# Patient Record
Sex: MALE | Race: WHITE | Employment: FULL TIME | URBAN - NONMETROPOLITAN AREA
[De-identification: names, ages, dates, MRNs, and addresses within clinical notes are randomized per-mention and may not be internally consistent; named-entity substitution may affect disease eponyms.]

---

## 2017-01-04 ENCOUNTER — OFFICE VISIT (OUTPATIENT)
Dept: ORTHOPEDIC SURGERY | Age: 62
End: 2017-01-04

## 2017-01-04 DIAGNOSIS — Z96.652 STATUS POST TOTAL KNEE REPLACEMENT, LEFT: Primary | ICD-10-CM

## 2017-01-04 DIAGNOSIS — M17.12 PRIMARY LOCALIZED OSTEOARTHROSIS, LOWER LEG, LEFT: ICD-10-CM

## 2017-01-04 PROCEDURE — 73560 X-RAY EXAM OF KNEE 1 OR 2: CPT | Performed by: ORTHOPAEDIC SURGERY

## 2017-01-04 PROCEDURE — 99024 POSTOP FOLLOW-UP VISIT: CPT | Performed by: ORTHOPAEDIC SURGERY

## 2017-01-04 RX ORDER — GABAPENTIN 300 MG/1
CAPSULE ORAL
Qty: 60 CAPSULE | Refills: 3 | Status: SHIPPED | OUTPATIENT
Start: 2017-01-04 | End: 2020-10-13

## 2017-01-04 RX ORDER — HYDROCODONE BITARTRATE AND ACETAMINOPHEN 5; 325 MG/1; MG/1
1-2 TABLET ORAL EVERY 4 HOURS PRN
Qty: 60 TABLET | Refills: 0 | Status: SHIPPED | OUTPATIENT
Start: 2017-01-04 | End: 2017-01-11

## 2017-02-01 ENCOUNTER — OFFICE VISIT (OUTPATIENT)
Dept: ORTHOPEDIC SURGERY | Age: 62
End: 2017-02-01

## 2017-02-01 VITALS — WEIGHT: 229.94 LBS | BODY MASS INDEX: 28.59 KG/M2 | HEIGHT: 75 IN

## 2017-02-01 DIAGNOSIS — Z96.652 STATUS POST TOTAL KNEE REPLACEMENT, LEFT: Primary | ICD-10-CM

## 2017-02-01 DIAGNOSIS — M10.071 IDIOPATHIC GOUT INVOLVING TOE OF RIGHT FOOT, UNSPECIFIED CHRONICITY: ICD-10-CM

## 2017-02-01 DIAGNOSIS — M17.12 PRIMARY OSTEOARTHRITIS OF LEFT KNEE: ICD-10-CM

## 2017-02-01 PROCEDURE — 99024 POSTOP FOLLOW-UP VISIT: CPT | Performed by: ORTHOPAEDIC SURGERY

## 2017-02-01 RX ORDER — METHYLPREDNISOLONE 4 MG/1
TABLET ORAL
Qty: 1 KIT | Refills: 0 | Status: SHIPPED | OUTPATIENT
Start: 2017-02-01 | End: 2020-10-13

## 2017-02-01 RX ORDER — INDOMETHACIN 50 MG/1
50 CAPSULE ORAL 3 TIMES DAILY
Qty: 90 CAPSULE | Refills: 3 | Status: SHIPPED | OUTPATIENT
Start: 2017-02-01 | End: 2020-10-13

## 2017-02-01 RX ORDER — HYDROCODONE BITARTRATE AND ACETAMINOPHEN 5; 325 MG/1; MG/1
1 TABLET ORAL EVERY 4 HOURS PRN
Qty: 40 TABLET | Refills: 0 | Status: SHIPPED | OUTPATIENT
Start: 2017-02-01 | End: 2017-02-08

## 2017-07-13 ENCOUNTER — OFFICE VISIT (OUTPATIENT)
Dept: ORTHOPEDIC SURGERY | Age: 62
End: 2017-07-13

## 2017-07-13 VITALS — WEIGHT: 235 LBS | HEIGHT: 75 IN | BODY MASS INDEX: 29.22 KG/M2

## 2017-07-13 DIAGNOSIS — M79.671 RIGHT FOOT PAIN: ICD-10-CM

## 2017-07-13 DIAGNOSIS — Z96.652 STATUS POST TOTAL KNEE REPLACEMENT, LEFT: Primary | ICD-10-CM

## 2017-07-13 DIAGNOSIS — M25.571 ACUTE RIGHT ANKLE PAIN: ICD-10-CM

## 2017-07-13 DIAGNOSIS — M25.462 KNEE EFFUSION, LEFT: ICD-10-CM

## 2017-07-13 PROCEDURE — 20610 DRAIN/INJ JOINT/BURSA W/O US: CPT | Performed by: ORTHOPAEDIC SURGERY

## 2017-07-13 PROCEDURE — 99213 OFFICE O/P EST LOW 20 MIN: CPT | Performed by: ORTHOPAEDIC SURGERY

## 2017-07-13 RX ORDER — ATORVASTATIN CALCIUM 20 MG/1
TABLET, FILM COATED ORAL
Refills: 1 | COMMUNITY
Start: 2017-05-06 | End: 2017-07-13

## 2017-07-13 RX ORDER — AMOXICILLIN 500 MG/1
500 TABLET, FILM COATED ORAL 4 TIMES DAILY
Qty: 40 TABLET | Refills: 0 | Status: SHIPPED | OUTPATIENT
Start: 2017-07-13 | End: 2020-10-13

## 2017-07-13 RX ORDER — NEBIVOLOL HYDROCHLORIDE 10 MG/1
TABLET ORAL
Refills: 1 | COMMUNITY
Start: 2017-07-05 | End: 2020-10-13

## 2017-07-13 RX ORDER — CEPHALEXIN 500 MG/1
CAPSULE ORAL
Refills: 0 | COMMUNITY
Start: 2017-05-27 | End: 2020-10-13

## 2017-07-13 RX ORDER — ALLOPURINOL 100 MG/1
100 TABLET ORAL
Refills: 1 | COMMUNITY
Start: 2017-07-05 | End: 2020-10-13

## 2017-09-27 ENCOUNTER — OFFICE VISIT (OUTPATIENT)
Dept: ORTHOPEDIC SURGERY | Age: 62
End: 2017-09-27

## 2017-09-27 VITALS
HEIGHT: 75 IN | SYSTOLIC BLOOD PRESSURE: 132 MMHG | BODY MASS INDEX: 29.22 KG/M2 | WEIGHT: 235.01 LBS | HEART RATE: 89 BPM | DIASTOLIC BLOOD PRESSURE: 91 MMHG

## 2017-09-27 DIAGNOSIS — S86.301A PERONEAL TENDON INJURY, RIGHT, INITIAL ENCOUNTER: ICD-10-CM

## 2017-09-27 DIAGNOSIS — M20.22 HALLUX RIGIDUS OF BOTH FEET: ICD-10-CM

## 2017-09-27 DIAGNOSIS — M20.21 HALLUX RIGIDUS OF BOTH FEET: ICD-10-CM

## 2017-09-27 DIAGNOSIS — M79.672 PAIN IN BOTH FEET: Primary | ICD-10-CM

## 2017-09-27 DIAGNOSIS — M79.671 PAIN IN BOTH FEET: Primary | ICD-10-CM

## 2017-09-27 PROBLEM — S86.309A PERONEAL TENDON INJURY: Status: ACTIVE | Noted: 2017-09-27

## 2017-09-27 PROCEDURE — 73630 X-RAY EXAM OF FOOT: CPT | Performed by: ORTHOPAEDIC SURGERY

## 2017-09-27 PROCEDURE — 99214 OFFICE O/P EST MOD 30 MIN: CPT | Performed by: ORTHOPAEDIC SURGERY

## 2017-09-27 PROCEDURE — L3040 FT ARCH SUPRT PREMOLD LONGIT: HCPCS | Performed by: ORTHOPAEDIC SURGERY

## 2017-09-27 RX ORDER — MELOXICAM 15 MG/1
15 TABLET ORAL DAILY
Qty: 30 TABLET | Refills: 2 | Status: SHIPPED | OUTPATIENT
Start: 2017-09-27 | End: 2020-10-13

## 2017-09-27 RX ORDER — LISINOPRIL 20 MG/1
TABLET ORAL
COMMUNITY
Start: 2017-09-07 | End: 2020-10-13

## 2017-10-03 ENCOUNTER — TELEPHONE (OUTPATIENT)
Dept: ORTHOPEDIC SURGERY | Age: 62
End: 2017-10-03

## 2017-10-03 DIAGNOSIS — M25.571 ACUTE RIGHT ANKLE PAIN: Primary | ICD-10-CM

## 2020-09-09 ENCOUNTER — OFFICE VISIT (OUTPATIENT)
Dept: ORTHOPEDIC SURGERY | Age: 65
End: 2020-09-09
Payer: COMMERCIAL

## 2020-09-09 VITALS — WEIGHT: 235.01 LBS | BODY MASS INDEX: 29.22 KG/M2 | HEIGHT: 75 IN | RESPIRATION RATE: 12 BRPM

## 2020-09-09 PROBLEM — M70.21 OLECRANON BURSITIS, RIGHT ELBOW: Status: ACTIVE | Noted: 2020-09-09

## 2020-09-09 PROBLEM — M25.521 RIGHT ELBOW PAIN: Status: ACTIVE | Noted: 2020-09-09

## 2020-09-09 PROCEDURE — 29105 APPLICATION LONG ARM SPLINT: CPT | Performed by: ORTHOPAEDIC SURGERY

## 2020-09-09 PROCEDURE — 20605 DRAIN/INJ JOINT/BURSA W/O US: CPT | Performed by: ORTHOPAEDIC SURGERY

## 2020-09-09 PROCEDURE — 99203 OFFICE O/P NEW LOW 30 MIN: CPT | Performed by: ORTHOPAEDIC SURGERY

## 2020-09-09 RX ORDER — BUPIVACAINE HYDROCHLORIDE 2.5 MG/ML
30 INJECTION, SOLUTION EPIDURAL; INFILTRATION; INTRACAUDAL ONCE
Status: COMPLETED | OUTPATIENT
Start: 2020-09-09 | End: 2020-09-09

## 2020-09-09 RX ADMIN — BUPIVACAINE HYDROCHLORIDE 75 MG: 2.5 INJECTION, SOLUTION EPIDURAL; INFILTRATION; INTRACAUDAL at 11:01

## 2020-09-09 NOTE — PROGRESS NOTES
ELBOW VISIT      HISTORY OF PRESENT ILLNESS    Geri Lazo is a 76 y.o. male who presents for evaluation of right elbow swelling that is had over the last several days. He is been using ice and rates pain 5 or 6 but says he has some pain going down the forearm. He is doing Xarelto and is concerned about bleeding. Says the pain is intermittent but ice seems to help. He cannot recall any specific trauma. ROS    Well-documented patient history form dated 9/9/2020  All other ROS negative except for above. Past Surgical history    Past Surgical History:   Procedure Laterality Date    APPENDECTOMY      JOINT REPLACEMENT      right knee    KNEE SURGERY      SHOULDER ARTHROSCOPY      SHOULDER SURGERY      TOTAL KNEE ARTHROPLASTY Left 12/12/2016       PAST MEDICAL    Past Medical History:   Diagnosis Date    Arthritis     Bleeding nose     CAD (coronary artery disease)     Cancer ([facility] [address] 1970    testicular    Fractures     Hyperlipidemia     Hypertension        Allergies    No Known Allergies    Meds    Current Outpatient Medications   Medication Sig Dispense Refill    lisinopril (PRINIVIL;ZESTRIL) 20 MG tablet       meloxicam (MOBIC) 15 MG tablet Take 1 tablet by mouth daily 30 tablet 2    allopurinol (ZYLOPRIM) 100 MG tablet   1    BYSTOLIC 10 MG tablet   1    cephALEXin (KEFLEX) 500 MG capsule   0    Amoxicillin 500 MG TABS Take 500 mg by mouth 4 times daily 40 tablet 0    naproxen-esomeprazole (VIMOVO) 500-20 MG TBEC Take 500 mg by mouth 2 times daily 60 tablet 2    diclofenac (PENNSAID) 2 % SOLN Place 40 mg onto the skin 2 times daily APPLY 2X A DAY PRN 1 Bottle 3    methylPREDNISolone (MEDROL, [place],) 4 MG tablet Take by mouth.  1 kit 0    indomethacin (INDOCIN) 50 MG capsule Take 1 capsule by mouth 3 times daily 90 capsule 3    gabapentin (NEURONTIN) 300 MG capsule SIQ 1-2 PRN QHS 60 capsule 3    aspirin 325 MG EC tablet Take 1 tablet by mouth 2 times daily 60 tablet 0    docusate (1.91 m)   Wt 235 lb 0.2 oz (106.6 kg)   BMI 29.22 kg/m²   General Appearance:  Normal body habitus. Alert and oriented to person, place, and time. Affect:  Normal.   Gait:  Normal. Good balance and coordination. Skin:  Intact. Sensation:  Intact. Strength:  Intact. Reflexes:  Intact. Pulses:  Intact. Elbow Examination  Wrist extension test negative  Radial tunnel is negative  Ligament stability stable    Range of motion  Right Left   Extension     Flexion     Supination     Pronation       Medial and lateral epicondyles are nontender. Distal biceps tendon is nontender. Elbow flexion test is negative. Over the right olecranon area. He does have some fluctuance he also has some swelling and ecchymoses going distal to the midshaft of the right forearm. There is no erythema or fluctuance in the forearm area but he does have the fluctuance without evidence of infection in the olecranon bursa. IMAGING STUDIES    X-rays 2 views right elbow demonstrate a traction spur the olecranon    IMPRESSION    Right hemorrhagic olecranon bursitis    PLAN    1. Conservative care options including physical therapy, NSAIDs, bracing, and activity modification were discussed. 2.  The indications for therapeutic injections were discussed. 3.  The indications for additional imaging studies were discussed. 4.  After considering the various options discussed, the patient elected to pursue a course that includes right olecranon bursa aspiration and a posterior splint application. Under a sterile Betadine prep, the right olecranon bursal region was sterilely prepped and draped using  1% Xylocaine as a local anesthetic and ethyl chloride as a topical refrigerant. The skin was numbed and then  a 10 mL syringe with an 18-gauge needle was then inserted into the region and hematoma was aspirated. The procedure was then aborted and the wound was cleaned and dressed. Encounter Diagnoses   Name Primary?     Right elbow pain     Olecranon bursitis, right elbow Yes      Orders Placed This Encounter   Procedures    XR ELBOW RIGHT (2 VIEWS)     Standing Status:   Future     Number of Occurrences:   1     Standing Expiration Date:   9/9/2021    MI ARTHROCENTESIS ASPIR&/INJ INTERM JT/BURS W/O US    MI APPLY LONG ARM SPLINT    MI CAST SUP LNG ARM SPLINT PLST

## 2020-09-09 NOTE — LETTER
[address]  [address] [facility] [address]  Phone: [phone]  Fax: [phone]    Isac Campbell MD        September 9, 2020     Patient: Shaylee Erickson   YOB: 1955   Date of Visit: 9/9/2020       To Whom it May Concern:    Burgess Saldana was seen in my clinic on 9/9/2020. He may return to work on 9/14/2020. If you have any questions or concerns, please don't hesitate to call. Sincerely,           Sherren Freiberg.  MD Isac Hawkins MD

## 2020-10-08 ENCOUNTER — OFFICE VISIT (OUTPATIENT)
Dept: ORTHOPEDIC SURGERY | Age: 65
End: 2020-10-08
Payer: COMMERCIAL

## 2020-10-08 VITALS — BODY MASS INDEX: 29.22 KG/M2 | WEIGHT: 235 LBS | RESPIRATION RATE: 15 BRPM | HEIGHT: 75 IN

## 2020-10-08 PROCEDURE — 99213 OFFICE O/P EST LOW 20 MIN: CPT | Performed by: ORTHOPAEDIC SURGERY

## 2020-10-08 NOTE — PROGRESS NOTES
ELBOW VISIT      HISTORY OF PRESENT ILLNESS    Emeli Cid is a 76 y.o. male who presents for evaluation of right elbow olecranon bursitis. Despite the aspiration the swelling is returned and he still has pain and says it is becoming more difficult to deal with. The nature and character of his pain basically remain the same or to a slightly worse degree. ROS    Well-documented patient history form dated 9/9/2020  All other ROS negative except for above. Past Surgical history    Past Surgical History:   Procedure Laterality Date    APPENDECTOMY      JOINT REPLACEMENT      right knee    KNEE SURGERY      SHOULDER ARTHROSCOPY      SHOULDER SURGERY      TOTAL KNEE ARTHROPLASTY Left 12/12/2016       PAST MEDICAL    Past Medical History:   Diagnosis Date    Arthritis     Bleeding nose     CAD (coronary artery disease)     Cancer ([facility] [address] 1970    testicular    Fractures     Hyperlipidemia     Hypertension        Allergies    No Known Allergies    Meds    Current Outpatient Medications   Medication Sig Dispense Refill    lisinopril (PRINIVIL;ZESTRIL) 20 MG tablet       meloxicam (MOBIC) 15 MG tablet Take 1 tablet by mouth daily 30 tablet 2    allopurinol (ZYLOPRIM) 100 MG tablet   1    BYSTOLIC 10 MG tablet   1    cephALEXin (KEFLEX) 500 MG capsule   0    Amoxicillin 500 MG TABS Take 500 mg by mouth 4 times daily 40 tablet 0    naproxen-esomeprazole (VIMOVO) 500-20 MG TBEC Take 500 mg by mouth 2 times daily 60 tablet 2    diclofenac (PENNSAID) 2 % SOLN Place 40 mg onto the skin 2 times daily APPLY 2X A DAY PRN 1 Bottle 3    methylPREDNISolone (MEDROL, [place],) 4 MG tablet Take by mouth.  1 kit 0    indomethacin (INDOCIN) 50 MG capsule Take 1 capsule by mouth 3 times daily 90 capsule 3    gabapentin (NEURONTIN) 300 MG capsule SIQ 1-2 PRN QHS 60 capsule 3    aspirin 325 MG EC tablet Take 1 tablet by mouth 2 times daily 60 tablet 0    docusate sodium (COLACE, DULCOLAX) 100 MG CAPS Take 100 mg by mouth 2 times daily      magnesium hydroxide (MILK OF MAGNESIA) 400 MG/5ML suspension Take 30 mLs by mouth daily as needed for Constipation      ranolazine (RANEXA) 500 MG extended release tablet Take 500 mg by mouth 2 times daily      metoprolol tartrate (LOPRESSOR) 25 MG tablet Take 25 mg by mouth 2 times daily       No current facility-administered medications for this visit.         Social    Social History     Socioeconomic History    Marital status:      Spouse name: Not on file    Number of children: Not on file    Years of education: Not on file    Highest education level: Not on file   Occupational History    Not on file   Social Needs    Financial resource strain: Not on file    Food insecurity     Worry: Not on file     Inability: Not on file    Transportation needs     Medical: Not on file     Non-medical: Not on file   Tobacco Use    Smoking status: Former Smoker   Substance and Sexual Activity    Alcohol use: Yes     Comment: occassional    Drug use: No    Sexual activity: Yes     Partners: Female   Lifestyle    Physical activity     Days per week: Not on file     Minutes per session: Not on file    Stress: Not on file   Relationships    Social connections     Talks on phone: Not on file     Gets together: Not on file     Attends Mu-ism service: Not on file     Active member of club or organization: Not on file     Attends meetings of clubs or organizations: Not on file     Relationship status: Not on file    Intimate partner violence     Fear of current or ex partner: Not on file     Emotionally abused: Not on file     Physically abused: Not on file     Forced sexual activity: Not on file   Other Topics Concern    Not on file   Social History Narrative    Not on file       Family HISTORY    Family History   Problem Relation Age of Onset    Diabetes Mother        PHYSICAL EXAM    Vital Signs:  Resp 15   Ht 6' 3\" (1.905 m)   Wt 235 lb (106.6 kg)   BMI 29.37 kg/m² General Appearance:  Normal body habitus. Alert and oriented to person, place, and time. Affect:  Normal.   Gait:  Normal. Good balance and coordination. Skin:  Intact. Sensation:  Intact. Strength:  Intact. Reflexes:  Intact. Pulses:  Intact. Elbow Examination  Wrist extension test negative  Radial tunnel is negative  Ligament stability stable    Range of motion  Right Left   Extension     Flexion     Supination     Pronation       Medial and lateral epicondyles are nontender. Distal biceps tendon is nontender. Elbow flexion test is negative. Over the right olecranon area. He does have some fluctuance he also has some swelling and ecchymoses going distal to the midshaft of the right forearm. There is no erythema or fluctuance in the forearm area but he does have the fluctuance without evidence of infection in the olecranon bursa. IMAGING STUDIES    X-rays were not done today  IMPRESSION    Right hemorrhagic olecranon bursitis    PLAN    1. Conservative care options including physical therapy, NSAIDs, bracing, and activity modification were discussed. 2.  The indications for therapeutic injections were discussed. 3.  The indications for additional imaging studies were discussed. 4.  After considering the various options discussed, the patient elected to pursue a course that includes surgical excision of the right olecranon bursa with traction spur excision. INFORMED CONSENT NOTE        We discussed the risks, benefits, and alternatives to the proposed procedure, as well as the necessity of other members of the healthcare team participating in the procedure. All questions were answered and the patient elected to proceed with the proposed procedure and signed the informed consent form. The patient was counseled at length about the risks of raj Covid-19 during their perioperative period and any recovery window from their procedure.   The patient was made aware that raj Covid-19  may worsen their prognosis for recovering from their procedure  and lend to a higher morbidity and/or mortality risk. All material risks, benefits, and reasonable alternatives including postponing the procedure were discussed. The patient does wish to proceed with the procedure at this time.

## 2020-10-09 PROBLEM — M25.729 OLECRANON BONE SPUR: Status: ACTIVE | Noted: 2020-10-09

## 2020-10-12 ENCOUNTER — HOSPITAL ENCOUNTER (OUTPATIENT)
Age: 65
Discharge: HOME | End: 2020-10-12
Payer: COMMERCIAL

## 2020-10-12 PROCEDURE — U0003 INFECTIOUS AGENT DETECTION BY NUCLEIC ACID (DNA OR RNA); SEVERE ACUTE RESPIRATORY SYNDROME CORONAVIRUS 2 (SARS-COV-2) (CORONAVIRUS DISEASE [COVID-19]), AMPLIFIED PROBE TECHNIQUE, MAKING USE OF HIGH THROUGHPUT TECHNOLOGIES AS DESCRIBED BY CMS-2020-01-R: HCPCS

## 2020-10-13 ENCOUNTER — TELEPHONE (OUTPATIENT)
Dept: ORTHOPEDIC SURGERY | Age: 65
End: 2020-10-13

## 2020-10-13 RX ORDER — SACUBITRIL AND VALSARTAN 49; 51 MG/1; MG/1
1 TABLET, FILM COATED ORAL 2 TIMES DAILY
COMMUNITY

## 2020-10-13 RX ORDER — AMLODIPINE BESYLATE 2.5 MG/1
2.5 TABLET ORAL DAILY
COMMUNITY

## 2020-10-13 RX ORDER — FUROSEMIDE 20 MG/1
20 TABLET ORAL DAILY
COMMUNITY

## 2020-10-13 NOTE — PRE-PROCEDURE INSTRUCTIONS

## 2020-10-13 NOTE — TELEPHONE ENCOUNTER
Auth: NPR  Date: 10/19/20  Spoke with: Chandra Vincent  Type of SX: OUTPATIENT  Location: [facility]  CPT [id number]   SX area: [facility]  Insurance: [facility]

## 2020-10-14 LAB — SARS-COV-2, NAA: NOT DETECTED

## 2020-10-16 ENCOUNTER — ANESTHESIA EVENT (OUTPATIENT)
Dept: OPERATING ROOM | Age: 65
End: 2020-10-16
Payer: COMMERCIAL

## 2020-10-19 ENCOUNTER — ANESTHESIA (OUTPATIENT)
Dept: OPERATING ROOM | Age: 65
End: 2020-10-19
Payer: COMMERCIAL

## 2020-10-19 ENCOUNTER — HOSPITAL ENCOUNTER (OUTPATIENT)
Age: 65
Setting detail: OUTPATIENT SURGERY
Discharge: HOME | End: 2020-10-19
Attending: ORTHOPAEDIC SURGERY | Admitting: ORTHOPAEDIC SURGERY
Payer: COMMERCIAL

## 2020-10-19 VITALS — DIASTOLIC BLOOD PRESSURE: 57 MMHG | OXYGEN SATURATION: 99 % | SYSTOLIC BLOOD PRESSURE: 101 MMHG

## 2020-10-19 VITALS
TEMPERATURE: 97.6 F | HEART RATE: 66 BPM | RESPIRATION RATE: 16 BRPM | DIASTOLIC BLOOD PRESSURE: 86 MMHG | OXYGEN SATURATION: 97 % | BODY MASS INDEX: 28.6 KG/M2 | WEIGHT: 230 LBS | HEIGHT: 75 IN | SYSTOLIC BLOOD PRESSURE: 116 MMHG

## 2020-10-19 LAB
ANION GAP SERPL CALCULATED.3IONS-SCNC: 7 MMOL/L (ref 3–16)
BUN BLDV-MCNC: 11 MG/DL (ref 7–20)
CALCIUM SERPL-MCNC: 8.9 MG/DL (ref 8.3–10.6)
CHLORIDE BLD-SCNC: 96 MMOL/L (ref 99–110)
CO2: 26 MMOL/L (ref 21–32)
CREAT SERPL-MCNC: 0.7 MG/DL (ref 0.8–1.3)
GFR AFRICAN AMERICAN: >60
GFR NON-AFRICAN AMERICAN: >60
GLUCOSE BLD-MCNC: 137 MG/DL (ref 70–99)
POTASSIUM SERPL-SCNC: 4.7 MMOL/L (ref 3.5–5.1)
SODIUM BLD-SCNC: 129 MMOL/L (ref 136–145)

## 2020-10-19 PROCEDURE — 6360000002 HC RX W HCPCS: Performed by: NURSE ANESTHETIST, CERTIFIED REGISTERED

## 2020-10-19 PROCEDURE — 2580000003 HC RX 258: Performed by: ANESTHESIOLOGY

## 2020-10-19 PROCEDURE — 3600000012 HC SURGERY LEVEL 2 ADDTL 15MIN: Performed by: ORTHOPAEDIC SURGERY

## 2020-10-19 PROCEDURE — 2709999900 HC NON-CHARGEABLE SUPPLY: Performed by: ORTHOPAEDIC SURGERY

## 2020-10-19 PROCEDURE — 3700000001 HC ADD 15 MINUTES (ANESTHESIA): Performed by: ORTHOPAEDIC SURGERY

## 2020-10-19 PROCEDURE — 6360000002 HC RX W HCPCS: Performed by: ORTHOPAEDIC SURGERY

## 2020-10-19 PROCEDURE — 7100000010 HC PHASE II RECOVERY - FIRST 15 MIN: Performed by: ORTHOPAEDIC SURGERY

## 2020-10-19 PROCEDURE — 36415 COLL VENOUS BLD VENIPUNCTURE: CPT

## 2020-10-19 PROCEDURE — 3600000002 HC SURGERY LEVEL 2 BASE: Performed by: ORTHOPAEDIC SURGERY

## 2020-10-19 PROCEDURE — 2500000003 HC RX 250 WO HCPCS: Performed by: NURSE ANESTHETIST, CERTIFIED REGISTERED

## 2020-10-19 PROCEDURE — 80048 BASIC METABOLIC PNL TOTAL CA: CPT

## 2020-10-19 PROCEDURE — 6370000000 HC RX 637 (ALT 250 FOR IP): Performed by: ANESTHESIOLOGY

## 2020-10-19 PROCEDURE — 7100000000 HC PACU RECOVERY - FIRST 15 MIN: Performed by: ORTHOPAEDIC SURGERY

## 2020-10-19 PROCEDURE — 7100000001 HC PACU RECOVERY - ADDTL 15 MIN: Performed by: ORTHOPAEDIC SURGERY

## 2020-10-19 PROCEDURE — 84132 ASSAY OF SERUM POTASSIUM: CPT

## 2020-10-19 PROCEDURE — 3700000000 HC ANESTHESIA ATTENDED CARE: Performed by: ORTHOPAEDIC SURGERY

## 2020-10-19 PROCEDURE — 2580000003 HC RX 258: Performed by: ORTHOPAEDIC SURGERY

## 2020-10-19 PROCEDURE — 7100000011 HC PHASE II RECOVERY - ADDTL 15 MIN: Performed by: ORTHOPAEDIC SURGERY

## 2020-10-19 RX ORDER — SODIUM CHLORIDE 0.9 % (FLUSH) 0.9 %
10 SYRINGE (ML) INJECTION EVERY 12 HOURS SCHEDULED
Status: DISCONTINUED | OUTPATIENT
Start: 2020-10-19 | End: 2020-10-19 | Stop reason: HOSPADM

## 2020-10-19 RX ORDER — BUPIVACAINE HYDROCHLORIDE AND EPINEPHRINE 5; 5 MG/ML; UG/ML
INJECTION, SOLUTION PERINEURAL PRN
Status: DISCONTINUED | OUTPATIENT
Start: 2020-10-19 | End: 2020-10-19 | Stop reason: ALTCHOICE

## 2020-10-19 RX ORDER — HYDROCODONE BITARTRATE AND ACETAMINOPHEN 7.5; 325 MG/1; MG/1
1 TABLET ORAL EVERY 6 HOURS PRN
Qty: 28 TABLET | Refills: 0 | Status: SHIPPED | OUTPATIENT
Start: 2020-10-19 | End: 2020-10-26

## 2020-10-19 RX ORDER — OXYCODONE HYDROCHLORIDE AND ACETAMINOPHEN 5; 325 MG/1; MG/1
1 TABLET ORAL PRN
Status: COMPLETED | OUTPATIENT
Start: 2020-10-19 | End: 2020-10-19

## 2020-10-19 RX ORDER — FENTANYL CITRATE 50 UG/ML
INJECTION, SOLUTION INTRAMUSCULAR; INTRAVENOUS PRN
Status: DISCONTINUED | OUTPATIENT
Start: 2020-10-19 | End: 2020-10-19 | Stop reason: SDUPTHER

## 2020-10-19 RX ORDER — OXYCODONE HYDROCHLORIDE AND ACETAMINOPHEN 5; 325 MG/1; MG/1
2 TABLET ORAL PRN
Status: COMPLETED | OUTPATIENT
Start: 2020-10-19 | End: 2020-10-19

## 2020-10-19 RX ORDER — LIDOCAINE HYDROCHLORIDE 20 MG/ML
INJECTION, SOLUTION INFILTRATION; PERINEURAL PRN
Status: DISCONTINUED | OUTPATIENT
Start: 2020-10-19 | End: 2020-10-19 | Stop reason: SDUPTHER

## 2020-10-19 RX ORDER — SODIUM CHLORIDE 0.9 % (FLUSH) 0.9 %
10 SYRINGE (ML) INJECTION PRN
Status: DISCONTINUED | OUTPATIENT
Start: 2020-10-19 | End: 2020-10-19 | Stop reason: HOSPADM

## 2020-10-19 RX ORDER — PROPOFOL 10 MG/ML
INJECTION, EMULSION INTRAVENOUS PRN
Status: DISCONTINUED | OUTPATIENT
Start: 2020-10-19 | End: 2020-10-19 | Stop reason: SDUPTHER

## 2020-10-19 RX ORDER — MAGNESIUM HYDROXIDE 1200 MG/15ML
LIQUID ORAL CONTINUOUS PRN
Status: COMPLETED | OUTPATIENT
Start: 2020-10-19 | End: 2020-10-19

## 2020-10-19 RX ORDER — LABETALOL HYDROCHLORIDE 5 MG/ML
5 INJECTION, SOLUTION INTRAVENOUS
Status: DISCONTINUED | OUTPATIENT
Start: 2020-10-19 | End: 2020-10-19 | Stop reason: HOSPADM

## 2020-10-19 RX ORDER — DIPHENHYDRAMINE HYDROCHLORIDE 50 MG/ML
6.25 INJECTION INTRAMUSCULAR; INTRAVENOUS
Status: DISCONTINUED | OUTPATIENT
Start: 2020-10-19 | End: 2020-10-19 | Stop reason: HOSPADM

## 2020-10-19 RX ORDER — LIDOCAINE HYDROCHLORIDE 10 MG/ML
1 INJECTION, SOLUTION EPIDURAL; INFILTRATION; INTRACAUDAL; PERINEURAL
Status: DISCONTINUED | OUTPATIENT
Start: 2020-10-19 | End: 2020-10-19 | Stop reason: HOSPADM

## 2020-10-19 RX ORDER — ONDANSETRON 2 MG/ML
4 INJECTION INTRAMUSCULAR; INTRAVENOUS EVERY 30 MIN PRN
Status: DISCONTINUED | OUTPATIENT
Start: 2020-10-19 | End: 2020-10-19 | Stop reason: HOSPADM

## 2020-10-19 RX ORDER — DEXAMETHASONE SODIUM PHOSPHATE 4 MG/ML
INJECTION, SOLUTION INTRA-ARTICULAR; INTRALESIONAL; INTRAMUSCULAR; INTRAVENOUS; SOFT TISSUE PRN
Status: DISCONTINUED | OUTPATIENT
Start: 2020-10-19 | End: 2020-10-19 | Stop reason: SDUPTHER

## 2020-10-19 RX ORDER — HYDRALAZINE HYDROCHLORIDE 20 MG/ML
5 INJECTION INTRAMUSCULAR; INTRAVENOUS EVERY 30 MIN PRN
Status: DISCONTINUED | OUTPATIENT
Start: 2020-10-19 | End: 2020-10-19 | Stop reason: HOSPADM

## 2020-10-19 RX ORDER — SODIUM CHLORIDE, SODIUM LACTATE, POTASSIUM CHLORIDE, CALCIUM CHLORIDE 600; 310; 30; 20 MG/100ML; MG/100ML; MG/100ML; MG/100ML
INJECTION, SOLUTION INTRAVENOUS CONTINUOUS
Status: DISCONTINUED | OUTPATIENT
Start: 2020-10-19 | End: 2020-10-19 | Stop reason: HOSPADM

## 2020-10-19 RX ORDER — MEPERIDINE HYDROCHLORIDE 25 MG/ML
12.5 INJECTION INTRAMUSCULAR; INTRAVENOUS; SUBCUTANEOUS EVERY 5 MIN PRN
Status: DISCONTINUED | OUTPATIENT
Start: 2020-10-19 | End: 2020-10-19 | Stop reason: HOSPADM

## 2020-10-19 RX ORDER — ONDANSETRON 2 MG/ML
INJECTION INTRAMUSCULAR; INTRAVENOUS PRN
Status: DISCONTINUED | OUTPATIENT
Start: 2020-10-19 | End: 2020-10-19 | Stop reason: SDUPTHER

## 2020-10-19 RX ADMIN — LIDOCAINE HYDROCHLORIDE 50 MG: 20 INJECTION, SOLUTION INFILTRATION; PERINEURAL at 10:22

## 2020-10-19 RX ADMIN — FENTANYL CITRATE 50 MCG: 50 INJECTION INTRAMUSCULAR; INTRAVENOUS at 10:30

## 2020-10-19 RX ADMIN — SODIUM CHLORIDE, POTASSIUM CHLORIDE, SODIUM LACTATE AND CALCIUM CHLORIDE: 600; 310; 30; 20 INJECTION, SOLUTION INTRAVENOUS at 10:16

## 2020-10-19 RX ADMIN — Medication 1.5 G: at 09:14

## 2020-10-19 RX ADMIN — ONDANSETRON 4 MG: 2 INJECTION, SOLUTION INTRAMUSCULAR; INTRAVENOUS at 10:33

## 2020-10-19 RX ADMIN — SODIUM CHLORIDE, POTASSIUM CHLORIDE, SODIUM LACTATE AND CALCIUM CHLORIDE: 600; 310; 30; 20 INJECTION, SOLUTION INTRAVENOUS at 09:14

## 2020-10-19 RX ADMIN — DEXAMETHASONE SODIUM PHOSPHATE 4 MG: 4 INJECTION, SOLUTION INTRAMUSCULAR; INTRAVENOUS at 10:33

## 2020-10-19 RX ADMIN — OXYCODONE HYDROCHLORIDE AND ACETAMINOPHEN 1 TABLET: 5; 325 TABLET ORAL at 11:54

## 2020-10-19 RX ADMIN — PROPOFOL 300 MG: 10 INJECTION, EMULSION INTRAVENOUS at 10:22

## 2020-10-19 ASSESSMENT — PULMONARY FUNCTION TESTS
PIF_VALUE: 14
PIF_VALUE: 16
PIF_VALUE: 21
PIF_VALUE: 20
PIF_VALUE: 15
PIF_VALUE: 1
PIF_VALUE: 21
PIF_VALUE: 2
PIF_VALUE: 14
PIF_VALUE: 18
PIF_VALUE: 1
PIF_VALUE: 16
PIF_VALUE: 14
PIF_VALUE: 17
PIF_VALUE: 16
PIF_VALUE: 14
PIF_VALUE: 14
PIF_VALUE: 15
PIF_VALUE: 19
PIF_VALUE: 1
PIF_VALUE: 14
PIF_VALUE: 14
PIF_VALUE: 19
PIF_VALUE: 14
PIF_VALUE: 16
PIF_VALUE: 6
PIF_VALUE: 14
PIF_VALUE: 0
PIF_VALUE: 14
PIF_VALUE: 17
PIF_VALUE: 14
PIF_VALUE: 17
PIF_VALUE: 14
PIF_VALUE: 19
PIF_VALUE: 16
PIF_VALUE: 0
PIF_VALUE: 5
PIF_VALUE: 17

## 2020-10-19 ASSESSMENT — PAIN - FUNCTIONAL ASSESSMENT
PAIN_FUNCTIONAL_ASSESSMENT: 0-10
PAIN_FUNCTIONAL_ASSESSMENT: 0-10

## 2020-10-19 ASSESSMENT — PAIN SCALES - GENERAL: PAINLEVEL_OUTOF10: 4

## 2020-10-19 NOTE — ANESTHESIA PRE PROCEDURE
Department of Anesthesiology  Preprocedure Note       Name:  Jimmie Conway   Age:  76 y.o.  :  1955                                          MRN:  [id number]         Date:  10/19/2020      Surgeon: Billie Santa):  Cyril Mohs, MD    Procedure: Procedure(s):  RIGHT OLECRANON BURSECTOMY, TRACTION SPUR EXCISION    Medications prior to admission:   Prior to Admission medications    Medication Sig Start Date End Date Taking?  Authorizing Provider   amLODIPine (NORVASC) 2.5 MG tablet Take 2.5 mg by mouth daily   Yes Historical Provider, MD   furosemide (LASIX) 20 MG tablet Take 20 mg by mouth daily   Yes Historical Provider, MD   rivaroxaban (XARELTO) 20 MG TABS tablet Take 20 mg by mouth daily (with breakfast)   Yes Historical Provider, MD   sacubitril-valsartan (ENTRESTO) 49-51 MG per tablet Take 1 tablet by mouth 2 times daily   Yes Historical Provider, MD   metoprolol tartrate (LOPRESSOR) 25 MG tablet Take 100 mg by mouth 2 times daily    Yes Historical Provider, MD       Current medications:    Current Facility-Administered Medications   Medication Dose Route Frequency Provider Last Rate Last Dose    vancomycin 1.5 g in dextrose 5% 300 mL IVPB  1,500 mg Intravenous Once Cyril Mohs,  mL/hr at 10/19/20 0914 1.5 g at 10/19/20 0914    lactated ringers infusion   Intravenous Continuous Marcos Hernandez  mL/hr at 10/19/20 0914      sodium chloride flush 0.9 % injection 10 mL  10 mL Intravenous 2 times per day Marcos Hernandez MD        sodium chloride flush 0.9 % injection 10 mL  10 mL Intravenous PRN Marcos Hernandez MD        lidocaine PF 1 % injection 1 mL  1 mL Intradermal Once PRN Marcos Hernandez MD        HYDROmorphone (DILAUDID) injection 0.5 mg  0.5 mg Intravenous Q10 Min PRN Danitza Rapp MD        HYDROmorphone (DILAUDID) injection 0.5 mg  0.5 mg Intravenous Q5 Min PRN Danitza Rapp MD        oxyCODONE-acetaminophen (PERCOCET) 5-325 MG per tablet 1 tablet  1 tablet Oral PRN Estela Whitfield MD        Or    oxyCODONE-acetaminophen (PERCOCET) 5-325 MG per tablet 2 tablet  2 tablet Oral PRN Estela Whitfield MD        diphenhydrAMINE (BENADRYL) injection 6.25 mg  6.25 mg Intravenous Once PRN Estela Whitfield MD        [facility]dansetron [facility]) injection 4 mg  4 mg Intravenous Q30 Min PRN Estela Whitfield MD        labetalol (NORMODYNE;TRANDATE) injection 5 mg  5 mg Intravenous Q15 Min PRN Estela Whitfield MD        hydrALAZINE (APRESOLINE) injection 5 mg  5 mg Intravenous Q30 Min PRN Estela Whitfield MD        meperidine (DEMEROL) injection 12.5 mg  12.5 mg Intravenous Q5 Min PRN Estela Whitfield MD           Allergies:  No Known Allergies    Problem List:    Patient Active Problem List   Diagnosis Code    Pain in joint, lower leg M25.569    Primary localized osteoarthrosis, lower leg M17.10    Baker's cyst M71.20    Wrist pain, chronic M25.539, G89.29    Plantar fasciitis, right M72.2    Primary osteoarthritis of left knee M17.12    Status post total knee replacement, left K93.977    Idiopathic gout involving toe of right foot M10.071    Acute right ankle pain M25.571    Right foot pain M79.671    Knee effusion, left M25.462    Peroneal tendon injury S86.309A    Hallux rigidus of both feet M20.21, M20.22    Olecranon bursitis, right elbow M70.21    Right elbow pain M25.521    Olecranon bone spur M25.729       Past Medical History:        Diagnosis Date    Arthritis     Bleeding nose     CAD (coronary artery disease)     Cancer ([facility] [address] 1970    testicular    Fractures     Hyperlipidemia     Hypertension        Past Surgical History:        Procedure Laterality Date    APPENDECTOMY      JOINT REPLACEMENT      right knee    KNEE SURGERY      SHOULDER ARTHROSCOPY      SHOULDER SURGERY      TOTAL KNEE ARTHROPLASTY Left 12/12/2016       Social History:    Social History     Tobacco Use    Smoking status: Former Smoker    Smokeless tobacco: Never Used   Substance Use Topics    Alcohol use: Yes     Comment: occassional                                Counseling given: Not Answered      Vital Signs (Current):   Vitals:    10/13/20 1413 10/19/20 0836 10/19/20 0839   BP:   131/78   Pulse:   72   Resp:   20   Temp:   97.2 °F (36.2 °C)   TempSrc:  Temporal Temporal   SpO2:   97%   Weight: 230 lb (104.3 kg)     Height: 6' 3\" (1.905 m)                                                BP Readings from Last 3 Encounters:   10/19/20 131/78   09/27/17 (!) 132/91   12/15/16 133/73       NPO Status: Time of last liquid consumption: 1900                        Time of last solid consumption: 1900                        Date of last liquid consumption: 10/18/20                        Date of last solid food consumption: 10/18/20    BMI:   Wt Readings from Last 3 Encounters:   10/13/20 230 lb (104.3 kg)   10/08/20 235 lb (106.6 kg)   09/09/20 235 lb 0.2 oz (106.6 kg)     Body mass index is 28.75 kg/m². CBC:   Lab Results   Component Value Date    WBC 9.1 12/15/2016    RBC 3.24 12/15/2016    HGB 9.8 12/15/2016    HCT 28.3 12/15/2016    MCV 87.2 12/15/2016    RDW 13.1 12/15/2016     12/15/2016       CMP:   Lab Results   Component Value Date     12/15/2016    K 4.5 12/15/2016    CL 89 12/15/2016    CO2 28 12/15/2016    BUN 11 12/15/2016    CREATININE 0.8 12/15/2016    GFRAA >60 12/15/2016    LABGLOM >60 12/15/2016    GLUCOSE 124 12/15/2016    CALCIUM 8.2 12/15/2016       POC Tests: No results for input(s): POCGLU, POCNA, POCK, POCCL, POCBUN, POCHEMO, POCHCT in the last 72 hours.     Coags:   Lab Results   Component Value Date    PROTIME 11.0 11/30/2016    INR 0.96 11/30/2016    APTT 31.7 11/30/2016       HCG (If Applicable): No results found for: PREGTESTUR, PREGSERUM, HCG, HCGQUANT     ABGs: No results found for: PHART, PO2ART, BDP6ACU, EAE3XBP, BEART, Z9CLCJKW     Type & Screen (If Applicable):  No results found for: LABABO, [address]    Drug/Infectious Status (If Applicable):  No results found for: HIV, HEPCAB    COVID-19 Screening (If Applicable):   Lab Results   Component Value Date    COVID19 NOT DETECTED 10/12/2020         Anesthesia Evaluation  Patient summary reviewed and Nursing notes reviewed no history of anesthetic complications:   Airway: Mallampati: II     Neck ROM: full   Dental:          Pulmonary:                              Cardiovascular:    (+) hypertension:, CAD:,                   Neuro/Psych:               GI/Hepatic/Renal:             Endo/Other:                     Abdominal:           Vascular:                                        Anesthesia Plan      general     ASA 3       Induction: intravenous. Anesthetic plan and risks discussed with patient. Plan discussed with CRNA.                   Bryan Frederick MD   10/19/2020

## 2020-10-19 NOTE — BRIEF OP NOTE
Brief Postoperative Note      Patient: Yvette Choudhury  YOB: 1955  MRN: [id number]    Date of Procedure: 10/19/2020    Pre-Op Diagnosis: RIGHT OLECRANON BURSITIS, OLECRANON SPUR    Post-Op Diagnosis: Same       Procedure(s):  RIGHT OLECRANON BURSECTOMY, TRACTION SPUR EXCISION    Surgeon(s):  Vinh Irby MD    Assistant:  Surgical Assistant: Shayan Ying    Anesthesia: General    Estimated Blood Loss (mL): Minimal    Complications: None    Specimens:   * No specimens in log *    Implants:  * No implants in log *      Drains: * No LDAs found *    Findings: avni    Electronically signed by Vinh Irby MD on 10/19/2020 at 10:49 AM

## 2020-10-19 NOTE — PROGRESS NOTES
Arrived from OR awake and alert. Sling to right arm intact with good radial pulse palpable and brisk capillary refill noted. Report received from Tenisha Hein RN.   No discomfort noted at this time

## 2020-10-19 NOTE — PROGRESS NOTES
Patient is now a Phase II patient. Sipping coffee. Good radial pulse palpable with brisk capillary refill noted to right.   States pain is 3-4/10

## 2020-10-19 NOTE — OP NOTE
[address]                 [address] [address]                                OPERATIVE REPORT    PATIENT NAME: Mary Obregon                    :        1955  MED REC NO:   [id number]                          ROOM:  ACCOUNT NO:   [de-identified]                           ADMIT DATE: 10/19/2020  PROVIDER:     Baron Noonan MD    DATE OF PROCEDURE:  10/19/2020    PREOPERATIVE DIAGNOSIS:  Right olecranon bursitis with traction spur. POSTOPERATIVE DIAGNOSIS:  Right olecranon bursitis with traction spur. OPERATION PERFORMED:  Right elbow olecranon bursectomy and partial  ostectomy of olecranon i.e. traction spur excision. SURGEON:  Baron Noonan MD    ANESTHESIA:  General.    BLOOD LOSS:  Less than 5 mL. COMPLICATION:  None noted. INDICATIONS FOR OPERATION:  This is a 59-year-old male who presented  with history of right elbow pain and swelling consistent with olecranon  bursitis, which failed to improve over time, and after failure of all  other modalities, he elected for the recommendation of surgical  intervention. DESCRIPTION OF OPERATION:  The patient was taken to the operating room  where a general anesthetic was obtained. Antibiotics were given in IV  piggyback preoperatively. A tourniquet was placed around the right  proximal arm. The arm was exsanguinated with an Esmarch and the  tourniquet was inflated to 250 mmHg. The right arm was sterilely  prepped and draped and under loupe magnification, an 8 cm incision was  made over the olecranon bursa. Soft tissue was dissected down through  the skin and through the subcutaneous tissue where the bursitis was  identified and removed sharply from the subcutaneous tissue and off the  extensor mechanism. There was some redundant tissue that was also  rongeured to remove it. The spur was then identified, exposed, and  removed with a rongeur leaving a smooth surface.   The wound bed was then  scarified and infiltrated with 10 mL of 0.5% Marcaine with epinephrine  and the wound was closed with 3-0 Vicryl subcutaneous, 5-0 Monocryl  subcuticular, dressed with dressings, sponge, Dermabond, and Mepilex  dressing and wrapped with Webril. A posterior _____ spur wrapped with  an Ace wrap with the elbow in neutral position. He otherwise appeared  to tolerate the procedure well with less than 5 mL blood loss and no  noted complications.         Nisha Steve MD    D: 10/19/2020 10:58:49       T: 10/19/2020 13:29:56     [email or web address]  Job#: [id number]     Doc#: [id number]    CC:

## 2020-10-22 ENCOUNTER — OFFICE VISIT (OUTPATIENT)
Dept: ORTHOPEDIC SURGERY | Age: 65
End: 2020-10-22

## 2020-10-22 PROCEDURE — 99024 POSTOP FOLLOW-UP VISIT: CPT | Performed by: ORTHOPAEDIC SURGERY

## 2020-10-22 NOTE — PROGRESS NOTES
FOLLOW-UP VISIT      The patient returns for follow-up s/p RIGHT OLECRANON BURSECTOMY, TRACTION SPUR EXCISION    Date of Surgery    10/19/2020    Wound Status    Sutures Removed, Incisions are healing well with no surrounding erythema, and minimal ecchymosis. Exam    He has some minimal recurrence of seroma but no evidence of infection or drainage. I reSteri-Stripped his wound and do protected activity over the next 4 weeks and hemorrhage minimal for 3 weeks return here if needed.   He will return to work on 11/9/2020    Plan    As above    Follow-up Appointment    3 to 4 weeks if needed

## 2020-10-23 ENCOUNTER — TELEPHONE (OUTPATIENT)
Dept: ORTHOPEDIC SURGERY | Age: 65
End: 2020-10-23

## 2020-10-23 NOTE — TELEPHONE ENCOUNTER
WORKING ON [facility] PAPERWORK.  WAITING FOR A REPLY FROM GLORIA BEAUCHAMP) REGARDING TIME OFF WORK.   [facility]

## 2020-10-26 ENCOUNTER — TELEPHONE (OUTPATIENT)
Dept: ORTHOPEDIC SURGERY | Age: 65
End: 2020-10-26

## 2021-02-17 ENCOUNTER — OFFICE VISIT (OUTPATIENT)
Dept: ORTHOPEDIC SURGERY | Age: 66
End: 2021-02-17
Payer: COMMERCIAL

## 2021-02-17 VITALS — BODY MASS INDEX: 28.6 KG/M2 | HEIGHT: 75 IN | WEIGHT: 230 LBS

## 2021-02-17 DIAGNOSIS — M72.2 PLANTAR FASCIITIS OF LEFT FOOT: Primary | ICD-10-CM

## 2021-02-17 DIAGNOSIS — M79.672 PAIN OF LEFT HEEL: ICD-10-CM

## 2021-02-17 PROCEDURE — 20550 NJX 1 TENDON SHEATH/LIGAMENT: CPT | Performed by: ORTHOPAEDIC SURGERY

## 2021-02-17 PROCEDURE — 99214 OFFICE O/P EST MOD 30 MIN: CPT | Performed by: ORTHOPAEDIC SURGERY

## 2021-02-17 RX ORDER — BUPIVACAINE HYDROCHLORIDE 2.5 MG/ML
1 INJECTION, SOLUTION INFILTRATION; PERINEURAL ONCE
Status: COMPLETED | OUTPATIENT
Start: 2021-02-17 | End: 2021-02-17

## 2021-02-17 RX ORDER — METHYLPREDNISOLONE ACETATE 40 MG/ML
40 INJECTION, SUSPENSION INTRA-ARTICULAR; INTRALESIONAL; INTRAMUSCULAR; SOFT TISSUE ONCE
Status: COMPLETED | OUTPATIENT
Start: 2021-02-17 | End: 2021-02-17

## 2021-02-17 RX ORDER — LIDOCAINE HYDROCHLORIDE 10 MG/ML
1 INJECTION, SOLUTION INFILTRATION; PERINEURAL ONCE
Status: COMPLETED | OUTPATIENT
Start: 2021-02-17 | End: 2021-02-17

## 2021-02-17 RX ADMIN — METHYLPREDNISOLONE ACETATE 40 MG: 40 INJECTION, SUSPENSION INTRA-ARTICULAR; INTRALESIONAL; INTRAMUSCULAR; SOFT TISSUE at 13:57

## 2021-02-17 RX ADMIN — LIDOCAINE HYDROCHLORIDE 1 ML: 10 INJECTION, SOLUTION INFILTRATION; PERINEURAL at 13:57

## 2021-02-17 RX ADMIN — BUPIVACAINE HYDROCHLORIDE 2.5 MG: 2.5 INJECTION, SOLUTION INFILTRATION; PERINEURAL at 13:56

## 2021-02-17 NOTE — PROGRESS NOTES
Right Lower Extremity: Examination of the right lower extremity does not show any tenderness, deformity or injury. Range of motion is unremarkable. There is no gross instability. There are no rashes, ulcerations or lesions. Strength and tone are normal.    Radiology:     X-rays lateral calcaneus view demonstrates a small spur over the anterior calcaneus consistent with plantar fasciitis    Assessment : Left plantar fasciitis    Impression:  Encounter Diagnoses   Name Primary?  Pain of left heel     Plantar fasciitis of left foot Yes       Office Procedures:  Orders Placed This Encounter   Procedures    XR CALCANEUS LEFT (MIN 2 VIEWS)     Standing Status:   Future     Number of Occurrences:   1     Standing Expiration Date:   2/17/2022    KS INJECT TENDON SHEATH/LIGAMENT        Treatment Plan:  The etiology of left plantar fasciitis and it's appropriate treatment were discussed in great detail. Today we will give him an injection into the left plantar fascia using a solution of 1 cc of 1% Xylocaine, 1 cc of 0.25% Marcaine, and 1 cc of Depo-Medrol 40. Was done under sterile prep using ethyl chloride as a topical refrigerant. He tolerated the procedure well. I gave him some instruction for using diclofenac gel and heel spur inserts. I gave him some instructions for a physician directed therapy program also.

## 2022-12-07 ENCOUNTER — OFFICE VISIT (OUTPATIENT)
Dept: ORTHOPEDIC SURGERY | Age: 67
End: 2022-12-07
Payer: COMMERCIAL

## 2022-12-07 VITALS — WEIGHT: 230 LBS | BODY MASS INDEX: 28.6 KG/M2 | HEIGHT: 75 IN

## 2022-12-07 DIAGNOSIS — M75.41 ROTATOR CUFF IMPINGEMENT SYNDROME OF RIGHT SHOULDER: Primary | ICD-10-CM

## 2022-12-07 DIAGNOSIS — M25.511 RIGHT SHOULDER PAIN, UNSPECIFIED CHRONICITY: ICD-10-CM

## 2022-12-07 PROCEDURE — 1123F ACP DISCUSS/DSCN MKR DOCD: CPT | Performed by: ORTHOPAEDIC SURGERY

## 2022-12-07 PROCEDURE — 99213 OFFICE O/P EST LOW 20 MIN: CPT | Performed by: ORTHOPAEDIC SURGERY

## 2022-12-07 RX ORDER — HYDROCODONE BITARTRATE AND ACETAMINOPHEN 5; 325 MG/1; MG/1
1 TABLET ORAL EVERY 6 HOURS PRN
Qty: 28 TABLET | Refills: 0 | Status: SHIPPED | OUTPATIENT
Start: 2022-12-07 | End: 2022-12-14

## 2022-12-07 RX ORDER — TIZANIDINE 4 MG/1
4 TABLET ORAL NIGHTLY PRN
Qty: 30 TABLET | Refills: 0 | Status: SHIPPED | OUTPATIENT
Start: 2022-12-07

## 2022-12-07 RX ORDER — METHYLPREDNISOLONE 4 MG/1
TABLET ORAL
Qty: 1 KIT | Refills: 1 | Status: SHIPPED | OUTPATIENT
Start: 2022-12-07

## 2022-12-07 NOTE — PROGRESS NOTES
SHOULDER VISIT      HISTORY OF PRESENT ILLNESS    Devora Ellis is a 75 y.o. male who presents for evaluation chief complaints of right shoulder pain he has had over the last several weeks. He had an accident in 75s but had surgery to the left shoulder for dislocation but right has not been bad until last several weeks. Grades ten 9/10 says he cannot sleep the night goes down his arm to his elbow mostly medial side. He feels cracking and popping which is symptomatic throughout range of motion. He denies any recent trauma. He has tried medications and physical therapy none of which have helped. ROS    Well-documented patient history form dated 12/7/2022  All other ROS negative except for above. Past Surgical history    Past Surgical History:   Procedure Laterality Date    APPENDECTOMY      ELBOW SURGERY Right 10/19/2020    RIGHT OLECRANON BURSECTOMY, TRACTION SPUR EXCISION performed by Maci Arroa MD at [facility]      right knee    KNEE SURGERY      OTHER SURGICAL HISTORY  10/19/2020    right olecranon bursectomy, traction spur excision    SHOULDER ARTHROSCOPY      SHOULDER SURGERY      TOTAL KNEE ARTHROPLASTY Left 12/12/2016       PAST MEDICAL    Past Medical History:   Diagnosis Date    Arthritis     Bleeding nose     CAD (coronary artery disease)     Cancer ([facility] [address] 1970    testicular    Fractures     Hyperlipidemia     Hypertension        Allergies    No Known Allergies    Meds    Current Outpatient Medications   Medication Sig Dispense Refill    methylPREDNISolone (MEDROL, [place],) 4 MG tablet Take by mouth. 1 kit 1    tiZANidine (ZANAFLEX) 4 MG tablet Take 1 tablet by mouth nightly as needed (PRN) 30 tablet 0    HYDROcodone-acetaminophen (NORCO) 5-325 MG per tablet Take 1 tablet by mouth every 6 hours as needed for Pain for up to 7 days. Intended supply: 7 days.  Take lowest dose possible to manage pain 28 tablet 0    amLODIPine (NORVASC) 2.5 MG tablet Take 2.5 mg by mouth daily furosemide (LASIX) 20 MG tablet Take 20 mg by mouth daily      rivaroxaban (XARELTO) 20 MG TABS tablet Take 20 mg by mouth daily (with breakfast)      sacubitril-valsartan (ENTRESTO) 49-51 MG per tablet Take 1 tablet by mouth 2 times daily      metoprolol tartrate (LOPRESSOR) 25 MG tablet Take 100 mg by mouth 2 times daily        No current facility-administered medications for this visit. Social    Social History     Socioeconomic History    Marital status:      Spouse name: Not on file    Number of children: Not on file    Years of education: Not on file    Highest education level: Not on file   Occupational History    Not on file   Tobacco Use    Smoking status: Former    Smokeless tobacco: Never   Vaping Use    Vaping Use: Never used   Substance and Sexual Activity    Alcohol use: Yes     Comment: occassional    Drug use: No    Sexual activity: Yes     Partners: Female   Other Topics Concern    Not on file   Social History Narrative    Not on file     Social Determinants of Health     Financial Resource Strain: Not on file   Food Insecurity: Not on file   Transportation Needs: Not on file   Physical Activity: Not on file   Stress: Not on file   Social Connections: Not on file   Intimate Partner Violence: Not on file   Housing Stability: Not on file       Family HISTORY    Family History   Problem Relation Age of Onset    Diabetes Mother        PHYSICAL EXAM    Vital Signs:  Ht 6' 3\" (1.905 m)   Wt 230 lb (104.3 kg)   BMI 28.75 kg/m²   General Appearance:  Normal body habitus. Alert and oriented to person, place, and time. Affect:  Normal.   Skin:  Intact. Sensation:  Intact. Strength:  Intact. Reflexes:  Intact. Pulses:  Intact. Shoulder Exam  He has a full range of motion of the neck. He has negative Spurling's. Examination of the shoulder reveals: Limited range of motion secondary to pain, positive impingement signs for pain and crepitus in multiple directions.   I cannot detect whether he has apprehension because of his loss of motion. Neurologically appears intact    He has no tenderness over the proximal biceps. He has a full active range of motion of the elbow, wrist and hand. Range of motion  (in degrees)   Right Left   ABDUCTION       EXT. ROTATION       INT. ROTATION       FORWARD FLEX    STRENGTH         Provocative Test Positive Negative Not indicated   Spurling Sign [] [x] []   Cross Arm Adduction Test [x] [] []   Apprehension Sign [] [] [x]   Neer Sign [] [] []   Carpenter Impingement Sign [x] [] []   Yergason test [] [] []   OSerges test [] [] []     Other Provocative tests:     IMAGING STUDIES    X-rays 2 views the right shoulder reveals some nonspecific degenerative changes throughout the shoulder girdle and AC joint    IMPRESSION    Right shoulder pain secondary to rotator cuff tear and impingement    PLAN    1. Conservative care options including medicines and therapy were discussed. 2.  The indications for therapeutic injections were discussed. 3.  The indications for additional imaging studies were discussed.    4.  After considering the various options discussed, the patient elected to pursue a course that includes some pain medication but I would recommend an MRI of the right shoulder because of the length of symptoms the type of symptoms and his physical exam followed by consultation with Dr. Ernestina Terry

## 2022-12-20 ENCOUNTER — OFFICE VISIT (OUTPATIENT)
Dept: ORTHOPEDIC SURGERY | Age: 67
End: 2022-12-20

## 2022-12-20 VITALS — WEIGHT: 230 LBS | BODY MASS INDEX: 28.6 KG/M2 | HEIGHT: 75 IN

## 2022-12-20 DIAGNOSIS — M19.019 OSTEOARTHRITIS OF AC (ACROMIOCLAVICULAR) JOINT: ICD-10-CM

## 2022-12-20 DIAGNOSIS — M25.511 ACUTE PAIN OF RIGHT SHOULDER: Primary | ICD-10-CM

## 2022-12-20 DIAGNOSIS — M75.21 BICEPS TENDINITIS OF RIGHT SHOULDER: ICD-10-CM

## 2022-12-20 RX ORDER — TRIAMCINOLONE ACETONIDE 40 MG/ML
80 INJECTION, SUSPENSION INTRA-ARTICULAR; INTRAMUSCULAR ONCE
Status: COMPLETED | OUTPATIENT
Start: 2022-12-20 | End: 2022-12-20

## 2022-12-20 RX ORDER — BUPIVACAINE HYDROCHLORIDE 2.5 MG/ML
4 INJECTION, SOLUTION INFILTRATION; PERINEURAL ONCE
Status: COMPLETED | OUTPATIENT
Start: 2022-12-20 | End: 2022-12-20

## 2022-12-20 RX ADMIN — BUPIVACAINE HYDROCHLORIDE 10 MG: 2.5 INJECTION, SOLUTION INFILTRATION; PERINEURAL at 15:38

## 2022-12-20 RX ADMIN — TRIAMCINOLONE ACETONIDE 80 MG: 40 INJECTION, SUSPENSION INTRA-ARTICULAR; INTRAMUSCULAR at 15:39

## 2022-12-20 NOTE — PROGRESS NOTES
Chief Complaint  Shoulder Pain (NP Rt shoulder)      History of Present Illness:  Edgar Minor is a pleasant 75 y.o. male here today for new patient evaluation regarding his right shoulder. The patient was referred to me by Dr. Vianey Hirsch. The patient reports worsening pain in the shoulder for the past few months without any injury that he can recall. He reports popping to the shoulder. He reports his pain of being about a 2 out of 10. He reports some shooting pain that sometimes goes down his elbow. He was given an oral steroid by Dr. Vianey Hirsch and he noted good amount of improvement in his symptoms since then. He still has a good amount of pain especially in the front of his shoulder. The patient does have history of Xarelto use secondary to atrial fibrillation. Medical History:  Patient's medications, allergies, past medical, surgical, social and family histories were reviewed and updated as appropriate. Pertinent items are noted in HPI  Review of systems reviewed from Patient History Form available in the patient's chart under the Media tab. Vital Signs: There were no vitals filed for this visit. Constitutional: In no apparent distress. Normal affect. Alert and oriented X3 and is cooperative. Right shoulder exam    Inspection:  Held in a normal posture. Normal contour at the acromioclavicular joint. No swelling, ecchymosis, or erythema about the shoulder. No atrophy appreciated. No scapular winging. Palpation:  No subacromial crepitus. Mild [facility] joint. Very tender over the bicipital groove anteriorly. Positive Speed sign. Range of Motion: Full passive and active ROM. Normal scapulothoracic rhythm. Strength:  Normal supraspinatus, infraspinatus, 4 out of 5 subscapularis strength on belly press and bearhug. Stability: No anterior instability. No posterior instability. Special Tests: Impingement findings are negative. Other findings:  The skin is warm dry and well perfused. 2+ radial pulse. Sensation is intact to light touch over the deltoid. Radiology:       Axillary view of the right shoulder taken in the office today demonstrates no acute osseous abnormalities with well-maintained glenohumeral joint space. Moderate to severe AC joint arthritis noted. Prior x-rays reviewed demonstrate similar findings. Appropriate acromiohumeral interval.  MRI from [facility] demonstrate some tendinosis to the supraspinatus and a good amount of swelling around the biceps with likely upper border tearing of the subscapularis secondary to venu-subscapularis fluid. Assessment : 78-year-old male with right shoulder biceps tendinitis, possible upper border subscapularis tear    Impression:  Encounter Diagnoses   Name Primary? Acute pain of right shoulder Yes    Biceps tendinitis of right shoulder     Osteoarthritis of AC (acromioclavicular) joint        Office Procedures:  Orders Placed This Encounter   Procedures    XR SHOULDER RIGHT 1 VW     Standing Status:   Future     Number of Occurrences:   1     Standing Expiration Date:   12/16/2023    MT ARTHROCENTESIS ASPIR&/INJ MAJOR JT/BURSA W/O US         Plan:     I will get the patient started off on a nonoperative protocol. Overall his rotator cuff strength is rather good and his MRI is encouraging. However he does have a good amount of inflammation around his biceps and he may have an upper border subscapularis tear. I will start him off with an injection into the glenohumeral joint to treat the biceps inflammation and a home program for rotator cuff strengthening. Follow-up in 6 to 8 weeks for repeat evaluation. If he does not do well with this regimen he may be a candidate for a right shoulder arthroscopy with subpectoral biceps tenodesis and potential rotator cuff repair.       The indications and risks of steroid injection as well as treatment alternatives were discussed with the patient who consented to the procedure. Under sterile conditions and after informed consent was obtained, using posterior approach the patient was given an injection into the R shoulder glenohumeral joint. 2mL 40 mg of Kenalog and 4 mL of quarter percent bupivacaine were placed in the shoulder after it was prepped with chlorhexidine. This resulted in good relief of symptoms. There were no complications. The patient was advised to ice the shoulder this evening and to avoid vigorous activities for the next 2 days. They were advised to call us if there was any erythema, enduration, swelling or increasing pain. Mary Capellan is in agreement with this plan. All questions were answered to patient's satisfaction and was encouraged to call with any further questions. Hieu Andrade, DO  Orthopedic Surgery and Sports Medicine  12/20/2022      This dictation was performed with a verbal recognition program [facility]) and it was checked for errors. It is possible that there are still dictated errors within this office note. If so, please bring any errors to my attention for an addendum. All efforts were made to ensure that this office note is accurate.

## 2023-02-07 ENCOUNTER — OFFICE VISIT (OUTPATIENT)
Dept: ORTHOPEDIC SURGERY | Age: 68
End: 2023-02-07
Payer: COMMERCIAL

## 2023-02-07 VITALS — WEIGHT: 230 LBS | HEIGHT: 75 IN | BODY MASS INDEX: 28.6 KG/M2

## 2023-02-07 DIAGNOSIS — M75.21 BICEPS TENDINITIS OF RIGHT SHOULDER: Primary | ICD-10-CM

## 2023-02-07 DIAGNOSIS — M75.101 ROTATOR CUFF SYNDROME OF RIGHT SHOULDER: ICD-10-CM

## 2023-02-07 PROCEDURE — 99213 OFFICE O/P EST LOW 20 MIN: CPT | Performed by: STUDENT IN AN ORGANIZED HEALTH CARE EDUCATION/TRAINING PROGRAM

## 2023-02-07 PROCEDURE — 1123F ACP DISCUSS/DSCN MKR DOCD: CPT | Performed by: STUDENT IN AN ORGANIZED HEALTH CARE EDUCATION/TRAINING PROGRAM

## 2023-02-07 NOTE — PROGRESS NOTES
Chief Complaint  Shoulder Pain      History of Present Illness: The patient is here for repeat evaluation of his right shoulder. He underwent a steroid injection back in December. He noted very good relief for about 4 weeks and it started to wear off recently. He is still very active as he was cutting up firewood recently when he started to notice problems with the shoulder. Prior HPI 12/20/2022:  Governor Crawley is a pleasant 75 y.o. male here today for new patient evaluation regarding his right shoulder. The patient was referred to me by Dr. Bailey Chandler. The patient reports worsening pain in the shoulder for the past few months without any injury that he can recall. He reports popping to the shoulder. He reports his pain of being about a 2 out of 10. He reports some shooting pain that sometimes goes down his elbow. He was given an oral steroid by Dr. Bailey Chandler and he noted good amount of improvement in his symptoms since then. He still has a good amount of pain especially in the front of his shoulder. The patient does have history of Xarelto use secondary to atrial fibrillation. Pain Assessment  Location of Pain: Shoulder  Location Modifiers: Right  Severity of Pain: 3  Quality of Pain: Aching, Popping, Cracking  Duration of Pain: A few minutes  Frequency of Pain: Intermittent  Aggravating Factors: Bending, Stretching, Straightening, Exercise  Limiting Behavior: Yes  Relieving Factors: Heat  Result of Injury: No  Work-Related Injury: No  Are there other pain locations you wish to document?: No    Medical History:  Patient's medications, allergies, past medical, surgical, social and family histories were reviewed and updated as appropriate. Pertinent items are noted in HPI  Review of systems reviewed from Patient History Form available in the patient's chart under the Media tab. Vital Signs: There were no vitals filed for this visit. Constitutional: In no apparent distress.  Normal affect. Alert and oriented X3 and is cooperative. Right shoulder exam    Inspection:  Held in a normal posture. Normal contour at the acromioclavicular joint. No swelling, ecchymosis, or erythema about the shoulder. No atrophy appreciated. No scapular winging. Palpation:  No subacromial crepitus. Mild tenderness to the [facility] joint. Very tender over the bicipital groove anteriorly. Positive Speed sign. Range of Motion: Full passive and active ROM. Normal scapulothoracic rhythm. Strength:  Normal supraspinatus, infraspinatus, 4 out of 5 subscapularis strength on belly press and bearhug. Stability: No anterior instability. No posterior instability. Special Tests: Impingement findings are negative. Other findings: The skin is warm dry and well perfused. 2+ radial pulse. Sensation is intact to light touch over the deltoid. Radiology:       No new xrays today. MRI from [facility] demonstrate some tendinosis to the supraspinatus and a good amount of swelling around the biceps with likely upper border tearing of the subscapularis secondary to venu-subscapularis fluid. Assessment : 56-year-old male with right shoulder biceps tendinitis, possible upper border subscapularis tear    Impression:  Encounter Diagnoses   Name Primary? Biceps tendinitis of right shoulder Yes    Rotator cuff syndrome of right shoulder          Office Procedures:  No orders of the defined types were placed in this encounter. Plan: At this point I had another long discussion with him today. He did have a great response to the steroid injection. He has good range of motion but he still has a little bit of rotator cuff weakness on his exam.  His biceps and subscapularis exam findings are very positive. Based on all of this I do believe he may be a potential candidate for shoulder arthroscopy with rotator cuff repair of the subscapularis and biceps tenodesis.   However overall his shoulder still feels better than it did prior to the shot. I would like him to continue with his home program of exercises as well as continue to live his life and see how his shoulder feels. Follow-up in 2 months. If he still having problems with the shoulder at that point we will discuss potential shoulder arthroscopy. He verbalized understanding. Leidy Jackson is in agreement with this plan. All questions were answered to patient's satisfaction and was encouraged to call with any further questions. Sen Christianson, DO  Orthopedic Surgery and Sports Medicine  2/7/2023      This dictation was performed with a verbal recognition program [facility]) and it was checked for errors. It is possible that there are still dictated errors within this office note. If so, please bring any errors to my attention for an addendum. All efforts were made to ensure that this office note is accurate.

## 2023-03-07 NOTE — H&P
Update History & Physical    The patient's History and Physical  was reviewed with the patient and I examined the patient. There was no change. The surgical site was confirmed by the patient and me. Plan: The risks, benefits, expected outcome, and alternative to the recommended procedure have been discussed with the patient. Patient understands and wants to proceed with the procedure.      Electronically signed by Portia Spear MD on 10/19/2020 at 8:48 AM room air

## 2023-05-02 ENCOUNTER — OFFICE VISIT (OUTPATIENT)
Dept: ORTHOPEDIC SURGERY | Age: 68
End: 2023-05-02

## 2023-05-02 VITALS — WEIGHT: 230 LBS | HEIGHT: 75 IN | BODY MASS INDEX: 28.6 KG/M2

## 2023-05-02 DIAGNOSIS — M25.511 ACUTE PAIN OF RIGHT SHOULDER: ICD-10-CM

## 2023-05-02 DIAGNOSIS — M75.21 BICEPS TENDINITIS OF RIGHT SHOULDER: Primary | ICD-10-CM

## 2023-05-02 RX ORDER — BUPIVACAINE HYDROCHLORIDE 2.5 MG/ML
4 INJECTION, SOLUTION INFILTRATION; PERINEURAL ONCE
Status: COMPLETED | OUTPATIENT
Start: 2023-05-02 | End: 2023-05-02

## 2023-05-02 RX ORDER — METHYLPREDNISOLONE ACETATE 40 MG/ML
80 INJECTION, SUSPENSION INTRA-ARTICULAR; INTRALESIONAL; INTRAMUSCULAR; SOFT TISSUE ONCE
Status: COMPLETED | OUTPATIENT
Start: 2023-05-02 | End: 2023-05-02

## 2023-05-02 RX ADMIN — BUPIVACAINE HYDROCHLORIDE 10 MG: 2.5 INJECTION, SOLUTION INFILTRATION; PERINEURAL at 15:04

## 2023-05-02 RX ADMIN — METHYLPREDNISOLONE ACETATE 80 MG: 40 INJECTION, SUSPENSION INTRA-ARTICULAR; INTRALESIONAL; INTRAMUSCULAR; SOFT TISSUE at 15:05

## 2023-05-02 NOTE — PROGRESS NOTES
Chief Complaint  Shoulder Pain (CK Rt shoulder)        History of Present Illness: The patient is here for repeat evaluation of his right shoulder. The patient is about 5 months out from his left steroid injection. He is requesting another one today as he does not have any availability or time to undergo any surgery in the near future. He is looking to potentially discuss surgical intervention in the fall. Prior HPI 2/7/23: The patient is here for repeat evaluation of his right shoulder. He underwent a steroid injection back in December. He noted very good relief for about 4 weeks and it started to wear off recently. He is still very active as he was cutting up firewood recently when he started to notice problems with the shoulder. Prior HPI 12/20/2022:  Loulou Crowell is a pleasant 70 y.o. male here today for new patient evaluation regarding his right shoulder. The patient was referred to me by Dr. Sam Hernandez. The patient reports worsening pain in the shoulder for the past few months without any injury that he can recall. He reports popping to the shoulder. He reports his pain of being about a 2 out of 10. He reports some shooting pain that sometimes goes down his elbow. He was given an oral steroid by Dr. Sam Hernandez and he noted good amount of improvement in his symptoms since then. He still has a good amount of pain especially in the front of his shoulder. The patient does have history of Xarelto use secondary to atrial fibrillation. Medical History:  Patient's medications, allergies, past medical, surgical, social and family histories were reviewed and updated as appropriate. Pertinent items are noted in HPI  Review of systems reviewed from Patient History Form available in the patient's chart under the Media tab. Vital Signs: There were no vitals filed for this visit. Constitutional: In no apparent distress. Normal affect. Alert and oriented X3 and is cooperative.

## 2023-07-27 ENCOUNTER — OFFICE VISIT (OUTPATIENT)
Dept: ORTHOPEDIC SURGERY | Age: 68
End: 2023-07-27

## 2023-07-27 VITALS — WEIGHT: 230 LBS | HEIGHT: 75 IN | BODY MASS INDEX: 28.6 KG/M2

## 2023-07-27 DIAGNOSIS — M25.511 RIGHT SHOULDER PAIN, UNSPECIFIED CHRONICITY: Primary | ICD-10-CM

## 2023-07-27 RX ORDER — PREDNISONE 10 MG/1
10 TABLET ORAL DAILY
Qty: 10 TABLET | Refills: 0 | Status: SHIPPED | OUTPATIENT
Start: 2023-07-27 | End: 2023-08-06

## 2023-07-27 RX ORDER — TIZANIDINE 4 MG/1
4 TABLET ORAL NIGHTLY PRN
Qty: 30 TABLET | Refills: 0 | Status: SHIPPED | OUTPATIENT
Start: 2023-07-27

## 2023-07-27 NOTE — PROGRESS NOTES
Chief Complaint  Shoulder Pain (Right )        History of Present Illness:  Patient is here for repeat evaluation of his right shoulder. 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 However over the past few weeks he has noticed increased increasing pain throughout the shoulder and difficulty with range of motion and having issues with strength and functioning as well. Denies numbness burning tingling radiating pains into the hand or arm. Concern for worsening of the injuries to the shoulder. Prior HPI 5/2/23  The patient is here for repeat evaluation of his right shoulder. The patient is about 5 months out from his left steroid injection. He is requesting another one today as he does not have any availability or time to undergo any surgery in the near future. He is looking to potentially discuss surgical intervention in the fall. Prior HPI 2/7/23: The patient is here for repeat evaluation of his right shoulder. He underwent a steroid injection back in December. He noted very good relief for about 4 weeks and it started to wear off recently. He is still very active as he was cutting up firewood recently when he started to notice problems with the shoulder. Prior HPI 12/20/2022:  Savana Charles is a pleasant 70 y.o. male here today for new patient evaluation regarding his right shoulder. The patient was referred to me by Dr. Hannah Reveles. The patient reports worsening pain in the shoulder for the past few months without any injury that he can recall. He reports popping to the shoulder. He reports his pain of being about a 2 out of 10.

## 2023-07-28 ENCOUNTER — TELEPHONE (OUTPATIENT)
Dept: ORTHOPEDIC SURGERY | Age: 68
End: 2023-07-28

## 2023-07-28 NOTE — TELEPHONE ENCOUNTER
Spoke to patient and informed them that their MRI/CT has been authorized and that they can call and schedule scan at their convenience. Also told them that they can call and schedule a f/u with Myra Way once they have MRI/CT scheduled, leaving at least 2-3 days for our office to receive their results.

## (undated) DEVICE — SYRINGE MED 10ML LUERLOCK TIP W/O SFTY DISP

## (undated) DEVICE — SOLUTION IV IRRIG 500ML 0.9% SODIUM CHL 2F7123

## (undated) DEVICE — 3M™ COBAN™ NL STERILE NON-LATEX SELF-ADHERENT WRAP, 2084S, 4 IN X 5 YD (10 CM X 4,5 M), 18 ROLLS/CASE: Brand: 3M™ COBAN™

## (undated) DEVICE — GAUZE,SPONGE,4"X4",8PLY,STRL,LF,10/TRAY: Brand: MEDLINE

## (undated) DEVICE — GOWN,AURORA,NONREINF,RAGLAN,XXL,STERILE: Brand: MEDLINE

## (undated) DEVICE — STOCKINETTE,IMPERVIOUS,12X48,STERILE: Brand: MEDLINE

## (undated) DEVICE — MAJOR SET UP PK

## (undated) DEVICE — BANDAGE COMPR W4INXL15FT BGE E SGL LAYERED CLP CLSR

## (undated) DEVICE — SYSTEM SKIN CLSR 22CM DERMBND PRINEO

## (undated) DEVICE — ELECTRODE PT RET AD L9FT HI MOIST COND ADH HYDRGEL CORDED

## (undated) DEVICE — SUTURE MCRYL + SZ 4-0 L18IN ABSRB UD L19MM PS-2 3/8 CIR MCP496G

## (undated) DEVICE — SUTURE VCRL SZ 3-0 L18IN ABSRB UD L26MM SH 1/2 CIR J864D

## (undated) DEVICE — PADDING UNDERCAST W4INXL4YD 100% COT CRIMPED FINISH WBRL II

## (undated) DEVICE — APPLICATOR PREP 26ML 0.7% IOD POVACRYLEX 74% ISO ALC ST

## (undated) DEVICE — GOWN,NONREINFORCED,AURORA,XLARGE: Brand: MEDLINE

## (undated) DEVICE — Device

## (undated) DEVICE — GLOVE ORANGE PI 8 1/2   MSG9085

## (undated) DEVICE — BANDAGE E SELF CLSR 6INX5YD VELC SWIFTWRAP

## (undated) DEVICE — SKIN AFFIX SURG ADHESIVE 72/CS 0.55ML: Brand: MEDLINE

## (undated) DEVICE — PACK ORTH LO EXT VI

## (undated) DEVICE — BANDAGE COMPR W4INXL12FT E DISP ESMARCH EVEN

## (undated) DEVICE — SLING ARM L L165IN D75IN WHT POLY MESH ENVELOP MTL SIDE

## (undated) DEVICE — TIP SUCT DIA12FR W STYL CTRL VENT DISPOSABLE FRAZ

## (undated) DEVICE — SPLINT ORTH W3XL12IN LAYERED FBRGLS FOAM PD BRTH BK MOLD